# Patient Record
Sex: FEMALE | Race: BLACK OR AFRICAN AMERICAN | Employment: FULL TIME | URBAN - METROPOLITAN AREA
[De-identification: names, ages, dates, MRNs, and addresses within clinical notes are randomized per-mention and may not be internally consistent; named-entity substitution may affect disease eponyms.]

---

## 2017-10-13 ENCOUNTER — TELEPHONE (OUTPATIENT)
Dept: CARDIOLOGY CLINIC | Age: 37
End: 2017-10-13

## 2017-10-16 ENCOUNTER — OFFICE VISIT (OUTPATIENT)
Dept: CARDIOLOGY CLINIC | Age: 37
End: 2017-10-16

## 2017-10-16 VITALS
HEART RATE: 80 BPM | BODY MASS INDEX: 44.11 KG/M2 | DIASTOLIC BLOOD PRESSURE: 94 MMHG | WEIGHT: 257 LBS | SYSTOLIC BLOOD PRESSURE: 152 MMHG

## 2017-10-16 DIAGNOSIS — R55 SYNCOPE, UNSPECIFIED SYNCOPE TYPE: Primary | ICD-10-CM

## 2017-10-16 PROCEDURE — 99214 OFFICE O/P EST MOD 30 MIN: CPT | Performed by: INTERNAL MEDICINE

## 2017-10-16 NOTE — PROGRESS NOTES
Altagracia Gan MD        OFFICE  FOLLOWUP NOTE    Chief complaints: patient is here for management of syncope, HTN, dizziness  Subjective: no chest pain, + shortness of breath,+ dizziness, + palpitations    HPI Joy Jimenes is a 42 y. o.year old who  has a past medical history of Hx of echocardiogram and Hypertension. and presents for management of syncope, HTN, dizziness which are well controlled, patient felt dizzy and light headed at morning when she woke up 6:30 am, she took a showed, and when she came back to get dressed, she had dizziness. she felt off balance, she takes benedryl some times for allergies,she did not take it a the day or before syncope. No chest pain, no shortness of breath, she did not get her gastric bypass procedure. she felt palpitations. She takes verapamil for her blood pressure  Which can cause hypotension and some times bradycardia too    Current Outpatient Prescriptions   Medication Sig Dispense Refill    montelukast (SINGULAIR) 10 MG tablet Take 10 mg by mouth nightly      meloxicam (MOBIC) 15 MG tablet Take 15 mg by mouth daily      verapamil (VERELAN) 180 MG CR capsule Take 180 mg by mouth nightly      omeprazole (PRILOSEC) 20 MG capsule Take 20 mg by mouth Daily      fluticasone (FLONASE) 50 MCG/ACT nasal spray 1 spray by Nasal route 2 times daily       No current facility-administered medications for this visit. Allergies: Review of patient's allergies indicates no known allergies.   Past Medical History:   Diagnosis Date    Hx of echocardiogram 10/26/15    EF 60-65%, Normal, Mild LVH    Hypertension      Past Surgical History:   Procedure Laterality Date    BREAST SURGERY       Family History   Problem Relation Age of Onset    Heart Disease Mother      hole in heart     Social History   Substance Use Topics    Smoking status: Never Smoker    Smokeless tobacco: Never Used      Comment: Reviewed 10/16/2015    Alcohol use No      [unfilled]  Review of Systems:   · Constitutional: No Fever or Weight Loss   · Eyes: No Decreased Vision  · ENT: No Headaches, Hearing Loss or Vertigo  · Cardiovascular: No chest pain, dyspnea on exertion, + palpitations, no loss of consciousness  · Respiratory: No cough or wheezing    · Gastrointestinal: No abdominal pain, appetite loss, blood in stools, constipation, diarrhea or heartburn  · Genitourinary: No dysuria, trouble voiding, or hematuria  · Musculoskeletal:  No gait disturbance, weakness or joint complaints  · Integumentary: No rash or pruritis  · Neurological: No TIA or stroke symptoms  · Psychiatric: No anxiety or depression  · Endocrine: No malaise, fatigue or temperature intolerance  · Hematologic/Lymphatic: No bleeding problems, blood clots or swollen lymph nodes  · Allergic/Immunologic: No nasal congestion or hives  All systems negative except as marked. Objective:  BP (!) 152/94   Pulse 80   Wt 257 lb (116.6 kg)   BMI 44.11 kg/m²   Wt Readings from Last 3 Encounters:   10/16/17 257 lb (116.6 kg)   11/05/15 241 lb 8 oz (109.5 kg)   10/16/15 254 lb 9.6 oz (115.5 kg)     Body mass index is 44.11 kg/m². GENERAL - Alert, oriented, pleasant, in no apparent distress,normal grooming  HEENT  pupils are reactive to light and accomodation, cornea intact, conjunctive normal color, ears are normal in exam,throat exam in normal, teeth, gum and palate are normal, oral mucosa is normal without any notation of pallor or cyanosis  Neck - Supple. No jugular venous distention noted. No carotid bruits, no apical -carotid delay  Respiratory:  Normal breath sounds, No respiratory distress, No wheezing, No chest tenderness. ,no use of accessory muscles, diaphragm movement is normal  Cardiovascular: (PMI) apex non displaced,no lifts no thrills, no s3,no s4, Normal heart rate, Normal rhythm, No murmurs, No rubs, No gallops.  Carotid arteries pulse and amplitude are normal no bruit, no abdominal bruit noted ( normal abdominal aorta ausculation), femoral arteries pulse and amplitude are normal no bruit, pedal pulses are normal  Femoral pulses have normal amplitude, no bruits   Extremities - No cyanosis, clubbing, or significant edema, no varicose veins    Abdomen  No masses, tenderness, or organomegaly, no hepato-splenomegally, no bruits  Musculoskeletal  No significant edema, no kyphosis or scoliosis, no deformity in any extremity noted, muscle strength and tone are normal  Skin: no ulcer,no scar,no stasis dermatitis   Neurologic  alert oriented times 3,Cranial nerves II through XII are grossly intact. There were no gross focal neurologic abnormalities. All sensory and motor nerves examined and were normal  Psychiatric: normal mood and affect    No results found for: CKTOTAL, CKMB, CKMBINDEX, TROPONINI  BNP:  No results found for: BNP  PT/INR:  No results found for: PTINR  Lab Results   Component Value Date    LABA1C 6.0 10/12/2015     Lab Results   Component Value Date    CHOL 141 10/12/2015    TRIG 70 10/12/2015    HDL 39 (L) 10/12/2015    LDLDIRECT 93 10/12/2015     Lab Results   Component Value Date    ALT 15 10/12/2015    ALT 15 10/12/2015    AST 14 (L) 10/12/2015    AST 14 (L) 10/12/2015     TSH:  No results found for: TSH    Impression:  Jean Marie Antunez is a 42 y. o.year old who  has a past medical history of Hx of echocardiogram and Hypertension. and presents with     Plan:  1. Palpitations, dizziness and syncope: stress test, echo and event recorder ordered  2. Hypertension'; stable for now, if found to have bradycardia, will d.c verapamil. 3. Allergies: avoid benadryl  4. Ezema: stable  5. Health maintenance: exerise and diet  All labs, medications and tests reviewed, continue all other medications of all above medical condition listed as is.     [unfilled]

## 2017-11-06 ENCOUNTER — TELEPHONE (OUTPATIENT)
Dept: CARDIOLOGY CLINIC | Age: 37
End: 2017-11-06

## 2017-11-10 ENCOUNTER — PROCEDURE VISIT (OUTPATIENT)
Dept: CARDIOLOGY CLINIC | Age: 37
End: 2017-11-10

## 2017-11-10 DIAGNOSIS — R55 SYNCOPE, UNSPECIFIED SYNCOPE TYPE: Primary | ICD-10-CM

## 2017-11-10 DIAGNOSIS — R55 SYNCOPE, UNSPECIFIED SYNCOPE TYPE: ICD-10-CM

## 2017-11-10 DIAGNOSIS — R07.89 OTHER CHEST PAIN: Primary | ICD-10-CM

## 2017-11-10 LAB
LV EF: 55 %
LV EF: 64 %
LVEF MODALITY: NORMAL
LVEF MODALITY: NORMAL

## 2017-11-10 PROCEDURE — 93016 CV STRESS TEST SUPVJ ONLY: CPT | Performed by: INTERNAL MEDICINE

## 2017-11-10 PROCEDURE — 78452 HT MUSCLE IMAGE SPECT MULT: CPT | Performed by: INTERNAL MEDICINE

## 2017-11-10 PROCEDURE — 93306 TTE W/DOPPLER COMPLETE: CPT | Performed by: INTERNAL MEDICINE

## 2017-11-10 PROCEDURE — A9500 TC99M SESTAMIBI: HCPCS | Performed by: INTERNAL MEDICINE

## 2017-11-10 PROCEDURE — 93017 CV STRESS TEST TRACING ONLY: CPT | Performed by: INTERNAL MEDICINE

## 2017-11-10 PROCEDURE — 93018 CV STRESS TEST I&R ONLY: CPT | Performed by: INTERNAL MEDICINE

## 2017-11-13 ENCOUNTER — OFFICE VISIT (OUTPATIENT)
Dept: CARDIOLOGY CLINIC | Age: 37
End: 2017-11-13

## 2017-11-13 VITALS
HEIGHT: 64 IN | BODY MASS INDEX: 44.59 KG/M2 | DIASTOLIC BLOOD PRESSURE: 80 MMHG | SYSTOLIC BLOOD PRESSURE: 130 MMHG | HEART RATE: 80 BPM | WEIGHT: 261.2 LBS

## 2017-11-13 DIAGNOSIS — I20.8 STABLE ANGINA PECTORIS (HCC): Primary | ICD-10-CM

## 2017-11-13 PROCEDURE — 99214 OFFICE O/P EST MOD 30 MIN: CPT | Performed by: INTERNAL MEDICINE

## 2017-11-13 RX ORDER — CLONIDINE HYDROCHLORIDE 0.1 MG/1
0.1 TABLET ORAL 2 TIMES DAILY
COMMUNITY

## 2017-11-13 RX ORDER — TOPIRAMATE 25 MG/1
25 CAPSULE, COATED PELLETS ORAL 2 TIMES DAILY
COMMUNITY
End: 2017-12-07

## 2017-11-13 NOTE — PROGRESS NOTES
Steff Mcrae MD        OFFICE  FOLLOWUP NOTE    Chief complaints: patient is here for management of syncope, HTN, dizziness, chest pain  Subjective: sinus congestion,+ chest pain    HPI Magui Matson is a 42 y. o.year old who  has a past medical history of High risk of cardiac event; Hx of echocardiogram; and Hypertension. and presents for management of syncope, HTN, dizziness, chest pain, which are well controlled, she started to have sinus congestion, her stress test is abnormal, not sure if it was breast tissue artifact. she has  chest pain for last few weeks, happening daily, intermittent for 15 to 20 mins and aggravated with activity substernal also,reproducible with palpation, radiated to shoulder, 6/10, tender to touch,associated with shortness of breath, + sweating, nausea,         Current Outpatient Prescriptions   Medication Sig Dispense Refill    cloNIDine (CATAPRES) 0.1 MG tablet Take 0.1 mg by mouth 2 times daily      metoprolol tartrate (LOPRESSOR) 25 MG tablet Take 25 mg by mouth 2 times daily      topiramate (TOPAMAX SPRINKLE) 25 MG capsule Take 25 mg by mouth 2 times daily      montelukast (SINGULAIR) 10 MG tablet Take 10 mg by mouth nightly      verapamil (VERELAN) 180 MG CR capsule Take 180 mg by mouth nightly      omeprazole (PRILOSEC) 20 MG capsule Take 20 mg by mouth Daily      fluticasone (FLONASE) 50 MCG/ACT nasal spray 1 spray by Nasal route 2 times daily       No current facility-administered medications for this visit. Allergies: Review of patient's allergies indicates no known allergies.   Past Medical History:   Diagnosis Date    High risk of cardiac event 10/16/2017    sinus rhythm    Hx of echocardiogram 10/26/15    EF 60-65%, Normal, Mild LVH    Hypertension      Past Surgical History:   Procedure Laterality Date    BREAST SURGERY       Family History   Problem Relation Age of Onset    Heart Disease Mother      hole in heart     Social History   Substance Use Topics    Smoking status: Never Smoker    Smokeless tobacco: Never Used      Comment: Reviewed 10/16/2015    Alcohol use No      [unfilled]  Review of Systems:   · Constitutional: No Fever or Weight Loss   · Eyes: No Decreased Vision  · ENT: + sinus congestion,No Headaches, Hearing Loss or Vertigo  · Cardiovascular:+  chest pain,+ dyspnea on exertion, palpitations or loss of consciousness  · Respiratory: No cough or wheezing    · Gastrointestinal: No abdominal pain, appetite loss, blood in stools, constipation, diarrhea or heartburn  · Genitourinary: No dysuria, trouble voiding, or hematuria  · Musculoskeletal:  No gait disturbance, weakness or joint complaints  · Integumentary: No rash or pruritis  · Neurological: No TIA or stroke symptoms  · Psychiatric: No anxiety or depression  · Endocrine: No malaise, fatigue or temperature intolerance  · Hematologic/Lymphatic: No bleeding problems, blood clots or swollen lymph nodes  · Allergic/Immunologic: No nasal congestion or hives  All systems negative except as marked. Objective:  /80   Pulse 80   Ht 5' 4\" (1.626 m)   Wt 261 lb 3.2 oz (118.5 kg)   BMI 44.83 kg/m²   Wt Readings from Last 3 Encounters:   11/13/17 261 lb 3.2 oz (118.5 kg)   10/16/17 257 lb (116.6 kg)   11/05/15 241 lb 8 oz (109.5 kg)     Body mass index is 44.83 kg/m². GENERAL - Alert, oriented, pleasant, in no apparent distress,normal grooming  HEENT  pupils are reactive to light and accomodation, cornea intact, conjunctive normal color, ears are normal in exam,throat exam in normal, teeth, gum and palate are normal, oral mucosa is normal without any notation of pallor or cyanosis  Neck - Supple. No jugular venous distention noted. No carotid bruits, no apical -carotid delay  Respiratory:  Normal breath sounds, No respiratory distress, No wheezing, No chest tenderness. ,no use of accessory muscles, diaphragm movement is normal  Cardiovascular: (PMI) apex non displaced,no lifts no thrills, no stable  1. Health maintenance: exerise and diet  All labs, medications and tests reviewed, continue all other medications of all above medical condition listed as is.     [unfilled]

## 2017-11-14 ENCOUNTER — TELEPHONE (OUTPATIENT)
Dept: CARDIOLOGY CLINIC | Age: 37
End: 2017-11-14

## 2017-11-14 NOTE — TELEPHONE ENCOUNTER
Pt given testing results during todays office visit      Summary   Normal left ventricle structure and function.   Ejection fraction is visually estimated at 50-60%.   No significant valvular disease noted.   No evidence of pericardial effusion.   Essentially normal echo.         [facility] physician Dr. Estelle Bonner . abnormal stress test, normal LVEF,    Myocardial perfusion scan shows small size, moderate intensity, reversible    perfusion defect in anterior wall        Recommendation   JESSICA RETREAT recommended

## 2017-11-28 ENCOUNTER — HOSPITAL ENCOUNTER (OUTPATIENT)
Dept: GENERAL RADIOLOGY | Age: 37
Discharge: HOME | End: 2017-11-28
Attending: INTERNAL MEDICINE | Admitting: INTERNAL MEDICINE

## 2017-11-28 DIAGNOSIS — Z01.811 PRE-OP CHEST EXAM: ICD-10-CM

## 2017-11-28 LAB
ANION GAP SERPL CALCULATED.3IONS-SCNC: 13 MMOL/L (ref 4–16)
APTT: 35.1 SECONDS (ref 21.2–33)
BUN BLDV-MCNC: 9 MG/DL (ref 6–23)
CALCIUM SERPL-MCNC: 8.9 MG/DL (ref 8.3–10.6)
CHLORIDE BLD-SCNC: 106 MMOL/L (ref 99–110)
CO2: 22 MMOL/L (ref 21–32)
CREAT SERPL-MCNC: 0.8 MG/DL (ref 0.6–1.1)
GFR AFRICAN AMERICAN: >60 ML/MIN/1.73M2
GFR NON-AFRICAN AMERICAN: >60 ML/MIN/1.73M2
GLUCOSE BLD-MCNC: 72 MG/DL (ref 70–99)
HCT VFR BLD CALC: 36.2 % (ref 37–47)
HEMOGLOBIN: 11.7 GM/DL (ref 12.5–16)
INR BLD: 1.02 INDEX
MCH RBC QN AUTO: 28.1 PG (ref 27–31)
MCHC RBC AUTO-ENTMCNC: 32.3 % (ref 32–36)
MCV RBC AUTO: 87 FL (ref 78–100)
PDW BLD-RTO: 14 % (ref 11.7–14.9)
PLATELET # BLD: 348 K/CU MM (ref 140–440)
PMV BLD AUTO: 11.4 FL (ref 7.5–11.1)
POTASSIUM SERPL-SCNC: 4.4 MMOL/L (ref 3.5–5.1)
PROTHROMBIN TIME: 11.6 SECONDS
RBC # BLD: 4.16 M/CU MM (ref 4.2–5.4)
SODIUM BLD-SCNC: 141 MMOL/L (ref 135–145)
WBC # BLD: 6.3 K/CU MM (ref 4–10.5)

## 2017-12-07 ENCOUNTER — OFFICE VISIT (OUTPATIENT)
Dept: CARDIOLOGY CLINIC | Age: 37
End: 2017-12-07

## 2017-12-07 VITALS
BODY MASS INDEX: 44.05 KG/M2 | HEIGHT: 64 IN | DIASTOLIC BLOOD PRESSURE: 88 MMHG | HEART RATE: 60 BPM | SYSTOLIC BLOOD PRESSURE: 118 MMHG | WEIGHT: 258 LBS

## 2017-12-07 DIAGNOSIS — E66.09 CLASS 1 OBESITY DUE TO EXCESS CALORIES WITH SERIOUS COMORBIDITY IN ADULT, UNSPECIFIED BMI: Primary | ICD-10-CM

## 2017-12-07 PROCEDURE — 99214 OFFICE O/P EST MOD 30 MIN: CPT | Performed by: INTERNAL MEDICINE

## 2017-12-08 ENCOUNTER — TELEPHONE (OUTPATIENT)
Dept: CARDIOLOGY CLINIC | Age: 37
End: 2017-12-08

## 2017-12-13 ENCOUNTER — TELEPHONE (OUTPATIENT)
Dept: CARDIOLOGY CLINIC | Age: 37
End: 2017-12-13

## 2017-12-13 NOTE — TELEPHONE ENCOUNTER
Received prior auth approval for qsymia. Called Baron White to inform her of the approval. Left here a message and asked her to call me for questions or concerns.

## 2017-12-18 ENCOUNTER — OFFICE VISIT (OUTPATIENT)
Dept: CARDIOLOGY CLINIC | Age: 37
End: 2017-12-18

## 2017-12-18 VITALS
SYSTOLIC BLOOD PRESSURE: 118 MMHG | HEIGHT: 64 IN | WEIGHT: 250 LBS | HEART RATE: 64 BPM | BODY MASS INDEX: 42.68 KG/M2 | DIASTOLIC BLOOD PRESSURE: 78 MMHG

## 2017-12-18 DIAGNOSIS — E66.09 CLASS 1 OBESITY DUE TO EXCESS CALORIES WITH SERIOUS COMORBIDITY IN ADULT, UNSPECIFIED BMI: ICD-10-CM

## 2017-12-18 DIAGNOSIS — G47.10 EXCESSIVE SLEEPINESS: Primary | ICD-10-CM

## 2017-12-18 PROCEDURE — 99213 OFFICE O/P EST LOW 20 MIN: CPT | Performed by: INTERNAL MEDICINE

## 2017-12-18 NOTE — PROGRESS NOTES
Reviewed 10/16/2015    Alcohol use No      [unfilled]  Review of Systems:   · Constitutional: No Fever or Weight Loss   · Eyes: No Decreased Vision  · ENT: No Headaches, Hearing Loss or Vertigo  · Cardiovascular: No chest pain, dyspnea on exertion, palpitations or loss of consciousness  · Respiratory: No cough or wheezing    · Gastrointestinal: No abdominal pain, appetite loss, blood in stools, constipation, diarrhea or heartburn  · Genitourinary: No dysuria, trouble voiding, or hematuria  · Musculoskeletal:  No gait disturbance, weakness or joint complaints  · Integumentary: No rash or pruritis  · Neurological: No TIA or stroke symptoms  · Psychiatric: No anxiety or depression  · Endocrine: No malaise, fatigue or temperature intolerance  · Hematologic/Lymphatic: No bleeding problems, blood clots or swollen lymph nodes  · Allergic/Immunologic: No nasal congestion or hives  All systems negative except as marked. Objective:  /78   Pulse 64   Ht 5' 4\" (1.626 m)   Wt 250 lb (113.4 kg)   LMP 11/25/2017   BMI 42.91 kg/m²   Wt Readings from Last 3 Encounters:   12/18/17 250 lb (113.4 kg)   12/07/17 258 lb (117 kg)   11/30/17 261 lb (118.4 kg)     Body mass index is 42.91 kg/m². GENERAL - Alert, oriented, pleasant, in no apparent distress,normal grooming  HEENT  pupils are reactive to light and accomodation, cornea intact, conjunctive normal color, ears are normal in exam,throat exam in normal, teeth, gum and palate are normal, oral mucosa is normal without any notation of pallor or cyanosis  Neck - Supple. No jugular venous distention noted. No carotid bruits, no apical -carotid delay  Respiratory:  Normal breath sounds, No respiratory distress, No wheezing, No chest tenderness. ,no use of accessory muscles, diaphragm movement is normal  Cardiovascular: (PMI) apex non displaced,no lifts no thrills, no s3,no s4, Normal heart rate, Normal rhythm, No murmurs, No rubs, No gallops.  Carotid arteries pulse and amplitude are normal no bruit, no abdominal bruit noted ( normal abdominal aorta ausculation), femoral arteries pulse and amplitude are normal no bruit, pedal pulses are normal  Femoral pulses have normal amplitude, no bruits   Extremities - No cyanosis, clubbing, or significant edema, no varicose veins    Abdomen  No masses, tenderness, or organomegaly, no hepato-splenomegally, no bruits  Musculoskeletal  No significant edema, no kyphosis or scoliosis, no deformity in any extremity noted, muscle strength and tone are normal  Skin: no ulcer,no scar,no stasis dermatitis   Neurologic  alert oriented times 3,Cranial nerves II through XII are grossly intact. There were no gross focal neurologic abnormalities. All sensory and motor nerves examined and were normal  Psychiatric: normal mood and affect    No results found for: CKTOTAL, CKMB, CKMBINDEX, TROPONINI  BNP:  No results found for: BNP  PT/INR:  No results found for: PTINR  Lab Results   Component Value Date    LABA1C 6.0 10/12/2015     Lab Results   Component Value Date    CHOL 141 10/12/2015    TRIG 70 10/12/2015    HDL 39 (L) 10/12/2015    LDLDIRECT 93 10/12/2015     Lab Results   Component Value Date    ALT 15 10/12/2015    ALT 15 10/12/2015    AST 14 (L) 10/12/2015    AST 14 (L) 10/12/2015     TSH:  No results found for: TSH    Impression:  Kera Whittington is a 42 y. o.year old who  has a past medical history of H/O echocardiogram; High risk of cardiac event; History of nuclear stress test; Hx of echocardiogram; and Hypertension. and presents with     Plan:  1. Palpitations, dizziness and syncope: abnoromal stress test,normal [facility]  2. Chest pain: resolved chest pain as above  3. Obesity: refill qsymia and increase it. 4. Sinus congestion: ok to use OTC medications  5. Hypertension';controlled, on 3 blood pressure medications./  6. Allergies: avoid benadryl  1.  Ezema: stableHealth maintenance: exerise and diet  All labs, medications and tests reviewed, continue all other medications of all above medical condition listed as is.     [unfilled]

## 2018-03-19 ENCOUNTER — OFFICE VISIT (OUTPATIENT)
Dept: CARDIOLOGY CLINIC | Age: 38
End: 2018-03-19

## 2018-03-19 VITALS
WEIGHT: 242.6 LBS | HEIGHT: 64 IN | BODY MASS INDEX: 41.42 KG/M2 | HEART RATE: 76 BPM | DIASTOLIC BLOOD PRESSURE: 80 MMHG | SYSTOLIC BLOOD PRESSURE: 110 MMHG

## 2018-03-19 DIAGNOSIS — E66.09 CLASS 1 OBESITY DUE TO EXCESS CALORIES WITH SERIOUS COMORBIDITY IN ADULT, UNSPECIFIED BMI: ICD-10-CM

## 2018-03-19 PROCEDURE — 99214 OFFICE O/P EST MOD 30 MIN: CPT | Performed by: INTERNAL MEDICINE

## 2018-03-19 NOTE — PROGRESS NOTES
10/16/2015    Alcohol use No      [unfilled]  Review of Systems:   · Constitutional: No Fever or Weight Loss   · Eyes: No Decreased Vision  · ENT: No Headaches, Hearing Loss or Vertigo  · Cardiovascular: No chest pain, dyspnea on exertion, palpitations or loss of consciousness  · Respiratory: No cough or wheezing    · Gastrointestinal: No abdominal pain, appetite loss, blood in stools, constipation, diarrhea or heartburn  · Genitourinary: No dysuria, trouble voiding, or hematuria  · Musculoskeletal:  No gait disturbance, weakness or joint complaints  · Integumentary: No rash or pruritis  · Neurological: No TIA or stroke symptoms  · Psychiatric: No anxiety or depression  · Endocrine: No malaise, fatigue or temperature intolerance  · Hematologic/Lymphatic: No bleeding problems, blood clots or swollen lymph nodes  · Allergic/Immunologic: No nasal congestion or hives  All systems negative except as marked. Objective:  /80   Pulse 76   Ht 5' 4\" (1.626 m)   Wt 242 lb 9.6 oz (110 kg)   BMI 41.64 kg/m²   Wt Readings from Last 3 Encounters:   03/19/18 242 lb 9.6 oz (110 kg)   12/18/17 250 lb (113.4 kg)   12/07/17 258 lb (117 kg)     Body mass index is 41.64 kg/m². GENERAL - Alert, oriented, pleasant, in no apparent distress,normal grooming  HEENT  pupils are reactive to light and accomodation, cornea intact, conjunctive normal color, ears are normal in exam,throat exam in normal, teeth, gum and palate are normal, oral mucosa is normal without any notation of pallor or cyanosis  Neck - Supple. No jugular venous distention noted. No carotid bruits, no apical -carotid delay  Respiratory:  Normal breath sounds, No respiratory distress, No wheezing, No chest tenderness. ,no use of accessory muscles, diaphragm movement is normal  Cardiovascular: (PMI) apex non displaced,no lifts no thrills, no s3,no s4, Normal heart rate, Normal rhythm, No murmurs, No rubs, No gallops.  Carotid arteries pulse and amplitude are normal no bruit, no abdominal bruit noted ( normal abdominal aorta ausculation), femoral arteries pulse and amplitude are normal no bruit, pedal pulses are normal  Femoral pulses have normal amplitude, no bruits   Extremities - No cyanosis, clubbing, or significant edema, no varicose veins    Abdomen  No masses, tenderness, or organomegaly, no hepato-splenomegally, no bruits  Musculoskeletal  No significant edema, no kyphosis or scoliosis, no deformity in any extremity noted, muscle strength and tone are normal  Skin: no ulcer,no scar,no stasis dermatitis   Neurologic  alert oriented times 3,Cranial nerves II through XII are grossly intact. There were no gross focal neurologic abnormalities. All sensory and motor nerves examined and were normal  Psychiatric: normal mood and affect    No results found for: CKTOTAL, CKMB, CKMBINDEX, TROPONINI  BNP:  No results found for: BNP  PT/INR:  No results found for: PTINR  Lab Results   Component Value Date    LABA1C 6.0 10/12/2015     Lab Results   Component Value Date    CHOL 141 10/12/2015    TRIG 70 10/12/2015    HDL 39 (L) 10/12/2015    LDLDIRECT 93 10/12/2015     Lab Results   Component Value Date    ALT 15 10/12/2015    ALT 15 10/12/2015    AST 14 (L) 10/12/2015    AST 14 (L) 10/12/2015     TSH:  No results found for: TSH    Impression:  Cole Zeng is a 34 y. o.year old who  has a past medical history of H/O echocardiogram; High risk of cardiac event; History of nuclear stress test; Hx of echocardiogram; and Hypertension. and presents with     Plan:  1. Palpitations, dizziness and syncope: abnoromal stress test,normal [facility]  2. Chest pain: resolved chest pain as above  3. Obesity: refill qsymia   4. Sinus congestion: ok to use OTC medications  5. Hypertension';controlled, on 3 blood pressure medications./  6. Allergies: avoid benadryl  7.  Ezema: stableHealth maintenance: exerise and dietHealth maintenance: exerise and diet  All labs, medications and tests reviewed, continue all

## 2018-04-10 ENCOUNTER — OFFICE VISIT (OUTPATIENT)
Dept: PHYSICAL MEDICINE AND REHAB | Age: 38
End: 2018-04-10

## 2018-04-10 DIAGNOSIS — M79.601 PARESTHESIA AND PAIN OF BOTH UPPER EXTREMITIES: Primary | ICD-10-CM

## 2018-04-10 DIAGNOSIS — M79.602 PARESTHESIA AND PAIN OF BOTH UPPER EXTREMITIES: Primary | ICD-10-CM

## 2018-04-10 DIAGNOSIS — R20.2 PARESTHESIA AND PAIN OF BOTH UPPER EXTREMITIES: Primary | ICD-10-CM

## 2018-04-10 DIAGNOSIS — M79.642 PAIN IN BOTH HANDS: ICD-10-CM

## 2018-04-10 DIAGNOSIS — M79.641 PAIN IN BOTH HANDS: ICD-10-CM

## 2018-04-10 PROCEDURE — 95911 NRV CNDJ TEST 9-10 STUDIES: CPT | Performed by: PHYSICAL MEDICINE & REHABILITATION

## 2018-04-10 PROCEDURE — 95886 MUSC TEST DONE W/N TEST COMP: CPT | Performed by: PHYSICAL MEDICINE & REHABILITATION

## 2018-07-26 ENCOUNTER — OFFICE VISIT (OUTPATIENT)
Dept: CARDIOLOGY CLINIC | Age: 38
End: 2018-07-26

## 2018-07-26 VITALS
HEIGHT: 64 IN | SYSTOLIC BLOOD PRESSURE: 110 MMHG | HEART RATE: 72 BPM | BODY MASS INDEX: 41.83 KG/M2 | DIASTOLIC BLOOD PRESSURE: 80 MMHG | WEIGHT: 245 LBS

## 2018-07-26 DIAGNOSIS — E66.09 CLASS 1 OBESITY DUE TO EXCESS CALORIES WITH SERIOUS COMORBIDITY IN ADULT, UNSPECIFIED BMI: Primary | ICD-10-CM

## 2018-07-26 PROCEDURE — 99214 OFFICE O/P EST MOD 30 MIN: CPT | Performed by: INTERNAL MEDICINE

## 2018-07-26 NOTE — PATIENT INSTRUCTIONS
Please remember to bring all medication bottles or a medication list with you to your appointment. If you have any questions, please call our office at [phone].

## 2018-08-23 ENCOUNTER — OFFICE VISIT (OUTPATIENT)
Dept: CARDIOLOGY CLINIC | Age: 38
End: 2018-08-23

## 2018-08-23 VITALS
HEART RATE: 86 BPM | DIASTOLIC BLOOD PRESSURE: 80 MMHG | SYSTOLIC BLOOD PRESSURE: 110 MMHG | HEIGHT: 64 IN | WEIGHT: 244.4 LBS | BODY MASS INDEX: 41.73 KG/M2

## 2018-08-23 DIAGNOSIS — E66.09 CLASS 1 OBESITY DUE TO EXCESS CALORIES WITH SERIOUS COMORBIDITY IN ADULT, UNSPECIFIED BMI: ICD-10-CM

## 2018-08-23 PROCEDURE — 99214 OFFICE O/P EST MOD 30 MIN: CPT | Performed by: INTERNAL MEDICINE

## 2018-08-23 NOTE — PROGRESS NOTES
Phares Gosselin, MD        OFFICE  FOLLOWUP NOTE    Chief complaints: patient is here for management of syncope, HTN, dizziness, chest pain, obesity    Subjective: patient feels better, no chest pain, no shortness of breath, no dizziness, no palpitations    HPI Padma Rose is a 34 y. o.year old who  has a past medical history of H/O echocardiogram; High risk of cardiac event; History of nuclear stress test; Hx of echocardiogram; and Hypertension. and presents for management of syncope, HTN, dizziness, chest pain, obesity which are well controlled, she lost 1 lbs on qsymia. Current Outpatient Prescriptions   Medication Sig Dispense Refill    Phentermine-Topiramate (QSYMIA) 11.25-69 MG CP24 Take 1 tablet by mouth daily for 31 days. . 30 capsule 0    cloNIDine (CATAPRES) 0.1 MG tablet Take 0.1 mg by mouth 2 times daily      metoprolol tartrate (LOPRESSOR) 25 MG tablet Take 25 mg by mouth 2 times daily      montelukast (SINGULAIR) 10 MG tablet Take 10 mg by mouth nightly      verapamil (VERELAN) 180 MG CR capsule Take 180 mg by mouth nightly      omeprazole (PRILOSEC) 20 MG capsule Take 20 mg by mouth Daily      fluticasone (FLONASE) 50 MCG/ACT nasal spray 1 spray by Nasal route 2 times daily       No current facility-administered medications for this visit. Allergies: Patient has no known allergies.   Past Medical History:   Diagnosis Date    H/O echocardiogram 11/10/2017    EF 50-60%     High risk of cardiac event 10/16/2017    sinus rhythm    History of nuclear stress test 11/10/2017    abnormal stress test    Hx of echocardiogram 10/26/15    EF 60-65%, Normal, Mild LVH    Hypertension      Past Surgical History:   Procedure Laterality Date    BREAST SURGERY       Family History   Problem Relation Age of Onset    Heart Disease Mother         hole in heart     Social History   Substance Use Topics    Smoking status: Never Smoker    Smokeless tobacco: Never Used      Comment: Reviewed 10/16/2015    Alcohol use No      [unfilled]  Review of Systems:   · Constitutional: No Fever or Weight Loss   · Eyes: No Decreased Vision  · ENT: No Headaches, Hearing Loss or Vertigo  · Cardiovascular: No chest pain, dyspnea on exertion, palpitations or loss of consciousness  · Respiratory: No cough or wheezing    · Gastrointestinal: No abdominal pain, appetite loss, blood in stools, constipation, diarrhea or heartburn  · Genitourinary: No dysuria, trouble voiding, or hematuria  · Musculoskeletal:  No gait disturbance, weakness or joint complaints  · Integumentary: No rash or pruritis  · Neurological: No TIA or stroke symptoms  · Psychiatric: No anxiety or depression  · Endocrine: No malaise, fatigue or temperature intolerance  · Hematologic/Lymphatic: No bleeding problems, blood clots or swollen lymph nodes  · Allergic/Immunologic: No nasal congestion or hives  All systems negative except as marked. Objective:  /80   Pulse 86   Ht 5' 4\" (1.626 m)   Wt 244 lb 6.4 oz (110.9 kg)   BMI 41.95 kg/m²   Wt Readings from Last 3 Encounters:   08/23/18 244 lb 6.4 oz (110.9 kg)   07/26/18 245 lb (111.1 kg)   03/19/18 242 lb 9.6 oz (110 kg)     Body mass index is 41.95 kg/m². GENERAL - Alert, oriented, pleasant, in no apparent distress,normal grooming  HEENT  pupils are reactive to light and accomodation, cornea intact, conjunctive normal color, ears are normal in exam,throat exam in normal, teeth, gum and palate are normal, oral mucosa is normal without any notation of pallor or cyanosis  Neck - Supple. No jugular venous distention noted. No carotid bruits, no apical -carotid delay  Respiratory:  Normal breath sounds, No respiratory distress, No wheezing, No chest tenderness. ,no use of accessory muscles, diaphragm movement is normal  Cardiovascular: (PMI) apex non displaced,no lifts no thrills, no s3,no s4, Normal heart rate, Normal rhythm, No murmurs, No rubs, No gallops.  Carotid arteries pulse and amplitude

## 2019-03-21 ENCOUNTER — OFFICE VISIT (OUTPATIENT)
Dept: FAMILY MEDICINE CLINIC | Age: 39
End: 2019-03-21
Payer: COMMERCIAL

## 2019-03-21 VITALS
OXYGEN SATURATION: 96 % | BODY MASS INDEX: 43.99 KG/M2 | WEIGHT: 264 LBS | DIASTOLIC BLOOD PRESSURE: 86 MMHG | HEIGHT: 65 IN | HEART RATE: 69 BPM | SYSTOLIC BLOOD PRESSURE: 124 MMHG | TEMPERATURE: 99.1 F

## 2019-03-21 DIAGNOSIS — J02.9 SORE THROAT: ICD-10-CM

## 2019-03-21 DIAGNOSIS — R51.9 FRONTAL HEADACHE: Primary | ICD-10-CM

## 2019-03-21 PROCEDURE — 99213 OFFICE O/P EST LOW 20 MIN: CPT | Performed by: NURSE PRACTITIONER

## 2019-03-21 ASSESSMENT — PATIENT HEALTH QUESTIONNAIRE - PHQ9
SUM OF ALL RESPONSES TO PHQ9 QUESTIONS 1 & 2: 0
2. FEELING DOWN, DEPRESSED OR HOPELESS: 0
1. LITTLE INTEREST OR PLEASURE IN DOING THINGS: 0
SUM OF ALL RESPONSES TO PHQ QUESTIONS 1-9: 0
SUM OF ALL RESPONSES TO PHQ QUESTIONS 1-9: 0

## 2019-03-21 ASSESSMENT — ENCOUNTER SYMPTOMS
ABDOMINAL PAIN: 1
SORE THROAT: 1
WHEEZING: 0
CHEST TIGHTNESS: 0
SHORTNESS OF BREATH: 0
COUGH: 0
NAUSEA: 0

## 2019-11-29 ENCOUNTER — HOSPITAL ENCOUNTER (OUTPATIENT)
Dept: WOMENS IMAGING | Age: 39
Discharge: HOME | End: 2019-11-29
Payer: COMMERCIAL

## 2019-11-29 DIAGNOSIS — Z12.31 BREAST CANCER SCREENING BY MAMMOGRAM: ICD-10-CM

## 2019-11-29 PROCEDURE — 77063 BREAST TOMOSYNTHESIS BI: CPT

## 2021-04-27 ENCOUNTER — HOSPITAL ENCOUNTER (OUTPATIENT)
Dept: MAMMOGRAPHY | Age: 41
Discharge: HOME | End: 2021-04-27
Payer: COMMERCIAL

## 2021-04-27 DIAGNOSIS — Z12.31 ENCOUNTER FOR SCREENING MAMMOGRAM FOR MALIGNANT NEOPLASM OF BREAST: ICD-10-CM

## 2021-04-27 PROCEDURE — 77063 BREAST TOMOSYNTHESIS BI: CPT

## 2022-08-03 ENCOUNTER — HOSPITAL ENCOUNTER (OUTPATIENT)
Dept: WOMENS IMAGING | Age: 42
Discharge: HOME | End: 2022-08-03
Payer: COMMERCIAL

## 2022-08-03 DIAGNOSIS — Z12.31 ENCOUNTER FOR SCREENING MAMMOGRAM FOR MALIGNANT NEOPLASM OF BREAST: ICD-10-CM

## 2022-08-03 PROCEDURE — 77063 BREAST TOMOSYNTHESIS BI: CPT

## 2023-09-28 LAB
ALBUMIN SERPL-MCNC: 4.3 G/DL
ALP BLD-CCNC: 67 U/L
ALT SERPL-CCNC: 23 U/L
ANION GAP SERPL CALCULATED.3IONS-SCNC: 1.8 MMOL/L
AST SERPL-CCNC: 20 U/L
BASOPHILS ABSOLUTE: 0 /ΜL
BASOPHILS RELATIVE PERCENT: 0.2 %
BILIRUB SERPL-MCNC: 0.4 MG/DL (ref 0.1–1.4)
BUN BLDV-MCNC: 7 MG/DL
CALCIUM SERPL-MCNC: 9.3 MG/DL
CHLORIDE BLD-SCNC: 102 MMOL/L
CO2: 23 MMOL/L
CREAT SERPL-MCNC: 0.7 MG/DL
EGFR: 109
EOSINOPHILS ABSOLUTE: 0.1 /ΜL
EOSINOPHILS RELATIVE PERCENT: 0.6 %
GLUCOSE BLD-MCNC: 111 MG/DL
HCT VFR BLD CALC: 33.9 % (ref 36–46)
HEMOGLOBIN: 11.5 G/DL (ref 12–16)
LYMPHOCYTES ABSOLUTE: 1.6 /ΜL
LYMPHOCYTES RELATIVE PERCENT: 19 %
MCH RBC QN AUTO: 28.2 PG
MCHC RBC AUTO-ENTMCNC: 33.9 G/DL
MCV RBC AUTO: 83.1 FL
MONOCYTES ABSOLUTE: 0.5 /ΜL
MONOCYTES RELATIVE PERCENT: 5.7 %
NEUTROPHILS ABSOLUTE: 6.1 /ΜL
NEUTROPHILS RELATIVE PERCENT: 74.3 %
PLATELET # BLD: 312 K/ΜL
PMV BLD AUTO: 11.2 FL
POTASSIUM SERPL-SCNC: 4.2 MMOL/L
RBC # BLD: 4.08 10^6/ΜL
SODIUM BLD-SCNC: 135 MMOL/L
TOTAL PROTEIN: 6.7
WBC # BLD: 8.2 10^3/ML

## 2023-10-09 ENCOUNTER — HOSPITAL ENCOUNTER (OUTPATIENT)
Dept: WOMENS IMAGING | Age: 43
Discharge: HOME | End: 2023-10-09
Payer: COMMERCIAL

## 2023-10-09 VITALS — WEIGHT: 279 LBS | HEIGHT: 64 IN | BODY MASS INDEX: 47.63 KG/M2

## 2023-10-09 DIAGNOSIS — Z12.31 SCREENING MAMMOGRAM, ENCOUNTER FOR: ICD-10-CM

## 2023-10-09 PROCEDURE — 77063 BREAST TOMOSYNTHESIS BI: CPT

## 2023-11-02 ENCOUNTER — OFFICE VISIT MH/BH (OUTPATIENT)
Dept: BARIATRICS/WEIGHT MGMT | Age: 43
End: 2023-11-02
Payer: COMMERCIAL

## 2023-11-02 ENCOUNTER — TELEPHONE (OUTPATIENT)
Dept: SURGERY | Age: 43
End: 2023-11-02

## 2023-11-02 ENCOUNTER — HOSPITAL ENCOUNTER (OUTPATIENT)
Age: 43
Setting detail: SPECIMEN
Discharge: HOME | End: 2023-11-02
Payer: COMMERCIAL

## 2023-11-02 VITALS
DIASTOLIC BLOOD PRESSURE: 88 MMHG | WEIGHT: 282.3 LBS | HEART RATE: 82 BPM | HEIGHT: 65 IN | OXYGEN SATURATION: 92 % | SYSTOLIC BLOOD PRESSURE: 124 MMHG | BODY MASS INDEX: 47.03 KG/M2

## 2023-11-02 DIAGNOSIS — E66.01 MORBID OBESITY WITH BMI OF 45.0-49.9, ADULT (HCC): Primary | ICD-10-CM

## 2023-11-02 DIAGNOSIS — Z79.899 MEDICATION MANAGEMENT: ICD-10-CM

## 2023-11-02 LAB
AMPHETAMINES: NEGATIVE
BARBITURATE SCREEN URINE: NEGATIVE
BENZODIAZEPINE SCREEN, URINE: NEGATIVE
CANNABINOID SCREEN URINE: NEGATIVE
COCAINE METABOLITE: NEGATIVE
FENTANYL URINE: NEGATIVE
INTERPRETATION: NORMAL
OPIATES, URINE: NEGATIVE
OXYCODONE: NEGATIVE
PREGNANCY, URINE: NEGATIVE

## 2023-11-02 PROCEDURE — 80307 DRUG TEST PRSMV CHEM ANLYZR: CPT

## 2023-11-02 PROCEDURE — 99204 OFFICE O/P NEW MOD 45 MIN: CPT | Performed by: NURSE PRACTITIONER

## 2023-11-02 PROCEDURE — 81025 URINE PREGNANCY TEST: CPT

## 2023-11-02 RX ORDER — ALBUTEROL SULFATE 90 UG/1
2 AEROSOL, METERED RESPIRATORY (INHALATION) 2 TIMES DAILY
COMMUNITY
Start: 2019-09-24

## 2023-11-02 RX ORDER — AZELASTINE 1 MG/ML
1 SPRAY, METERED NASAL 2 TIMES DAILY
COMMUNITY

## 2023-11-02 RX ORDER — OLOPATADINE HYDROCHLORIDE 1 MG/ML
1 SOLUTION/ DROPS OPHTHALMIC 2 TIMES DAILY
COMMUNITY

## 2023-11-02 RX ORDER — PANTOPRAZOLE SODIUM 40 MG/1
40 TABLET, DELAYED RELEASE ORAL DAILY
COMMUNITY
Start: 2023-08-07

## 2023-11-02 RX ORDER — CYCLOSPORINE 0.5 MG/ML
2 EMULSION OPHTHALMIC 2 TIMES DAILY
COMMUNITY

## 2023-11-02 NOTE — TELEPHONE ENCOUNTER
Called [facility] [phone]   For labs. Staff stated they looked back 6 mo and could not find labs orders or results.

## 2023-11-13 ENCOUNTER — HOSPITAL ENCOUNTER (OUTPATIENT)
Age: 43
Discharge: HOME | End: 2023-11-13
Payer: COMMERCIAL

## 2023-11-13 LAB
EKG ATRIAL RATE: 61 BPM
EKG DIAGNOSIS: NORMAL
EKG P AXIS: 28 DEGREES
EKG P-R INTERVAL: 146 MS
EKG Q-T INTERVAL: 404 MS
EKG QRS DURATION: 84 MS
EKG QTC CALCULATION (BAZETT): 406 MS
EKG R AXIS: -6 DEGREES
EKG T AXIS: -2 DEGREES
EKG VENTRICULAR RATE: 61 BPM

## 2023-11-13 PROCEDURE — 93010 ELECTROCARDIOGRAM REPORT: CPT | Performed by: INTERNAL MEDICINE

## 2023-11-13 PROCEDURE — 93005 ELECTROCARDIOGRAM TRACING: CPT | Performed by: NURSE PRACTITIONER

## 2023-11-16 ENCOUNTER — OFFICE VISIT (OUTPATIENT)
Dept: BARIATRICS/WEIGHT MGMT | Age: 43
End: 2023-11-16
Payer: COMMERCIAL

## 2023-11-16 VITALS
SYSTOLIC BLOOD PRESSURE: 110 MMHG | DIASTOLIC BLOOD PRESSURE: 90 MMHG | OXYGEN SATURATION: 97 % | WEIGHT: 282.2 LBS | BODY MASS INDEX: 47.02 KG/M2 | HEART RATE: 64 BPM | HEIGHT: 65 IN

## 2023-11-16 DIAGNOSIS — E66.01 MORBID OBESITY WITH BMI OF 45.0-49.9, ADULT (HCC): Primary | ICD-10-CM

## 2023-11-16 DIAGNOSIS — Z79.899 MEDICATION MANAGEMENT: ICD-10-CM

## 2023-11-16 PROCEDURE — 99214 OFFICE O/P EST MOD 30 MIN: CPT | Performed by: NURSE PRACTITIONER

## 2023-11-16 RX ORDER — PHENTERMINE HYDROCHLORIDE 37.5 MG/1
37.5 TABLET ORAL
Qty: 30 TABLET | Refills: 0 | Status: SHIPPED | OUTPATIENT
Start: 2023-11-16 | End: 2023-12-16

## 2023-12-14 ENCOUNTER — OFFICE VISIT (OUTPATIENT)
Dept: BARIATRICS/WEIGHT MGMT | Age: 43
End: 2023-12-14
Payer: COMMERCIAL

## 2023-12-14 VITALS
SYSTOLIC BLOOD PRESSURE: 120 MMHG | HEART RATE: 83 BPM | OXYGEN SATURATION: 96 % | HEIGHT: 65 IN | BODY MASS INDEX: 45.12 KG/M2 | DIASTOLIC BLOOD PRESSURE: 80 MMHG | WEIGHT: 270.8 LBS

## 2023-12-14 DIAGNOSIS — Z79.899 MEDICATION MANAGEMENT: ICD-10-CM

## 2023-12-14 DIAGNOSIS — E66.01 MORBID OBESITY WITH BMI OF 45.0-49.9, ADULT (HCC): Primary | ICD-10-CM

## 2023-12-14 PROCEDURE — 99214 OFFICE O/P EST MOD 30 MIN: CPT | Performed by: NURSE PRACTITIONER

## 2023-12-14 RX ORDER — SENNOSIDES A AND B 8.6 MG/1
1 TABLET, FILM COATED ORAL 2 TIMES DAILY
Qty: 60 TABLET | Refills: 11 | Status: SHIPPED | OUTPATIENT
Start: 2023-12-14 | End: 2024-12-13

## 2023-12-14 RX ORDER — PHENTERMINE HYDROCHLORIDE 37.5 MG/1
37.5 TABLET ORAL
Qty: 30 TABLET | Refills: 0 | Status: SHIPPED | OUTPATIENT
Start: 2023-12-14 | End: 2024-01-13

## 2024-01-15 ENCOUNTER — OFFICE VISIT (OUTPATIENT)
Dept: BARIATRICS/WEIGHT MGMT | Age: 44
End: 2024-01-15
Payer: COMMERCIAL

## 2024-01-15 VITALS
DIASTOLIC BLOOD PRESSURE: 82 MMHG | BODY MASS INDEX: 44.97 KG/M2 | HEART RATE: 86 BPM | OXYGEN SATURATION: 92 % | WEIGHT: 269.9 LBS | SYSTOLIC BLOOD PRESSURE: 120 MMHG | HEIGHT: 65 IN

## 2024-01-15 DIAGNOSIS — E66.01 MORBID OBESITY WITH BMI OF 45.0-49.9, ADULT (HCC): Primary | ICD-10-CM

## 2024-01-15 DIAGNOSIS — Z79.899 MEDICATION MANAGEMENT: ICD-10-CM

## 2024-01-15 PROCEDURE — 99214 OFFICE O/P EST MOD 30 MIN: CPT | Performed by: NURSE PRACTITIONER

## 2024-01-15 RX ORDER — VERAPAMIL HYDROCHLORIDE 240 MG/1
240 TABLET, FILM COATED, EXTENDED RELEASE ORAL DAILY
COMMUNITY
Start: 2024-01-04

## 2024-01-15 RX ORDER — PHENTERMINE HYDROCHLORIDE 37.5 MG/1
37.5 TABLET ORAL
Qty: 30 TABLET | Refills: 0 | Status: SHIPPED | OUTPATIENT
Start: 2024-01-15 | End: 2024-02-14

## 2024-01-15 RX ORDER — METOPROLOL SUCCINATE 25 MG/1
25 TABLET, EXTENDED RELEASE ORAL DAILY
COMMUNITY
Start: 2024-01-04

## 2024-01-15 NOTE — PROGRESS NOTES
Chief Complaint   Patient presents with    Weight Management     Med WM Adipex # 3         SUBJECTIVE:    HPI: Patient is here with complaints of: Monthly Adipex visit.    Obesity with a BMI of Body mass index is 45.61 kg/m²..    Current weight: 269 pounds    Weight change since last visit: -0.9 pounds (if pt failed to lose weight, cannot remain on medication).    Month 3 of being on Adipex.     Any new absolute contraindications (glaucoma, drug abuse, CAD, uncontrolled HTN, arrhythmias, stroke, CHF, hyperthyroidism, MAOI use, pregnant or breastfeeding) to being on medication: DENIES     Pt complains of the following side effects: DENIES    Current dietary measures: not tracking calories; eating more carbs than usual    Current exercise measures: limited     I have reviewed the patient's(pertinent information to this visit) medical history, family history(scanned in  the [employer] under \"patient questioner\"), social history and review of systems with the patient today in the office.          Past Surgical History:   Procedure Laterality Date    BREAST REDUCTION SURGERY Bilateral     age 16    BREAST SURGERY         Past Medical History:   Diagnosis Date    H/O echocardiogram 11/10/2017    EF 50-60%     High risk of cardiac event 10/16/2017    sinus rhythm    History of nuclear stress test 11/10/2017    abnormal stress test    Hx of echocardiogram 10/26/15    EF 60-65%, Normal, Mild LVH    Hypertension        Family History   Problem Relation Age of Onset    Uterine Cancer Mother     Heart Disease Mother         hole in heart    Breast Cancer Maternal Aunt     Breast Cancer Maternal Aunt     Ovarian Cancer Neg Hx        Social History     Socioeconomic History    Marital status:      Spouse name: Not on file    Number of children: Not on file    Years of education: Not on file    Highest education level: Not on file   Occupational History    Not on file   Tobacco Use    Smoking status: Never    Smokeless

## 2024-02-19 ENCOUNTER — OFFICE VISIT (OUTPATIENT)
Dept: BARIATRICS/WEIGHT MGMT | Age: 44
End: 2024-02-19
Payer: COMMERCIAL

## 2024-02-19 VITALS
BODY MASS INDEX: 43.75 KG/M2 | OXYGEN SATURATION: 96 % | SYSTOLIC BLOOD PRESSURE: 138 MMHG | WEIGHT: 262.6 LBS | HEART RATE: 82 BPM | DIASTOLIC BLOOD PRESSURE: 84 MMHG | HEIGHT: 65 IN

## 2024-02-19 DIAGNOSIS — Z79.899 MEDICATION MANAGEMENT: ICD-10-CM

## 2024-02-19 DIAGNOSIS — E66.01 MORBID OBESITY WITH BMI OF 40.0-44.9, ADULT (HCC): Primary | ICD-10-CM

## 2024-02-19 PROCEDURE — 99214 OFFICE O/P EST MOD 30 MIN: CPT | Performed by: NURSE PRACTITIONER

## 2024-02-19 RX ORDER — PHENTERMINE HYDROCHLORIDE 37.5 MG/1
37.5 TABLET ORAL
Qty: 30 TABLET | Refills: 0 | Status: SHIPPED | OUTPATIENT
Start: 2024-02-19 | End: 2024-03-20

## 2024-02-19 ASSESSMENT — ENCOUNTER SYMPTOMS: CONSTIPATION: 1

## 2024-02-19 NOTE — PROGRESS NOTES
Chief Complaint   Patient presents with    Weight Management     F/U Medication WM Visit - Adipex #4  Has Bar Cov         SUBJECTIVE:    HPI: Patient is here with complaints of: Monthly Adipex visit.    Obesity with a BMI of Body mass index is 44.38 kg/m²..    Current weight: 262 pounds    Weight change since last visit: -7.3 pounds (if pt failed to lose weight, cannot remain on medication).    Month 4 of being on Adipex.     Any new absolute contraindications (glaucoma, drug abuse, CAD, uncontrolled HTN, arrhythmias, stroke, CHF, hyperthyroidism, MAOI use, pregnant or breastfeeding) to being on medication: DENIES     Pt complains of the following side effects: DENIES    Current dietary measures: eating less carbs; more protein in diet; snacking less in evenings    Current exercise measures: limited    I have reviewed the patient's(pertinent information to this visit) medical history, family history(scanned in  the [employer] under \"patient questioner\"), social history and review of systems with the patient today in the office.          Past Surgical History:   Procedure Laterality Date    BREAST REDUCTION SURGERY Bilateral     age 16    BREAST SURGERY         Past Medical History:   Diagnosis Date    H/O echocardiogram 11/10/2017    EF 50-60%     High risk of cardiac event 10/16/2017    sinus rhythm    History of nuclear stress test 11/10/2017    abnormal stress test    Hx of echocardiogram 10/26/15    EF 60-65%, Normal, Mild LVH    Hypertension        Family History   Problem Relation Age of Onset    Uterine Cancer Mother     Heart Disease Mother         hole in heart    Breast Cancer Maternal Aunt     Breast Cancer Maternal Aunt     Ovarian Cancer Neg Hx        Social History     Socioeconomic History    Marital status:      Spouse name: Not on file    Number of children: Not on file    Years of education: Not on file    Highest education level: Not on file   Occupational History    Not on file   Tobacco Use

## 2024-03-20 ENCOUNTER — OFFICE VISIT (OUTPATIENT)
Dept: BARIATRICS/WEIGHT MGMT | Age: 44
End: 2024-03-20
Payer: COMMERCIAL

## 2024-03-20 VITALS
OXYGEN SATURATION: 95 % | BODY MASS INDEX: 43.42 KG/M2 | HEART RATE: 89 BPM | WEIGHT: 260.6 LBS | DIASTOLIC BLOOD PRESSURE: 74 MMHG | SYSTOLIC BLOOD PRESSURE: 112 MMHG | HEIGHT: 65 IN

## 2024-03-20 DIAGNOSIS — E66.01 MORBID OBESITY WITH BMI OF 40.0-44.9, ADULT (HCC): Primary | ICD-10-CM

## 2024-03-20 DIAGNOSIS — Z79.899 MEDICATION MANAGEMENT: ICD-10-CM

## 2024-03-20 PROCEDURE — 99214 OFFICE O/P EST MOD 30 MIN: CPT | Performed by: NURSE PRACTITIONER

## 2024-03-20 RX ORDER — PHENTERMINE HYDROCHLORIDE 37.5 MG/1
37.5 TABLET ORAL
Qty: 30 TABLET | Refills: 0 | Status: SHIPPED | OUTPATIENT
Start: 2024-03-20 | End: 2024-04-19

## 2024-03-20 NOTE — PROGRESS NOTES
Smokeless tobacco: Never    Tobacco comments:     Reviewed 10/16/2015   Vaping Use    Vaping Use: Never used   Substance and Sexual Activity    Alcohol use: No    Drug use: No    Sexual activity: Not on file     Comment:    Other Topics Concern    Not on file   Social History Narrative    Not on file     Social Determinants of Health     Financial Resource Strain: Not on file   Food Insecurity: Not on file   Transportation Needs: Not on file   Physical Activity: Not on file   Stress: Not on file   Social Connections: Not on file   Intimate Partner Violence: Not on file   Housing Stability: Not on file       Current Outpatient Medications   Medication Sig Dispense Refill    phentermine (ADIPEX-P) 37.5 MG tablet Take 1 tablet by mouth every morning (before breakfast) for 30 days. BMI 44. Max Daily Amount: 37.5 mg 30 tablet 0    phentermine (ADIPEX-P) 37.5 MG tablet Take 1 tablet by mouth every morning (before breakfast) for 30 days. BMI 44. Max Daily Amount: 37.5 mg 30 tablet 0    metoprolol succinate (TOPROL XL) 25 MG extended release tablet Take 1 tablet by mouth daily      verapamil (CALAN SR) 240 MG extended release tablet Take 1 tablet by mouth daily      senna (SENOKOT) 8.6 MG tablet Take 1 tablet by mouth 2 times daily 60 tablet 11    pantoprazole (PROTONIX) 40 MG tablet Take 1 tablet by mouth daily      albuterol sulfate HFA (PROVENTIL;VENTOLIN;PROAIR) 108 (90 Base) MCG/ACT inhaler Inhale 2 puffs into the lungs in the morning and at bedtime      cycloSPORINE (RESTASIS) 0.05 % ophthalmic emulsion Apply 2 drops to eye in the morning and at bedtime      olopatadine (PATADAY) 0.1 % ophthalmic solution Place 1 drop into both eyes 2 times daily      azelastine (ASTELIN) 0.1 % nasal spray 1 spray by Nasal route 2 times daily Use in each nostril as directed      PROAIR  (90 Base) MCG/ACT inhaler Inhale 2 puffs into the lungs every 4-6 hours as needed      cloNIDine (CATAPRES) 0.1 MG tablet Take 1

## 2024-04-22 ENCOUNTER — OFFICE VISIT (OUTPATIENT)
Dept: BARIATRICS/WEIGHT MGMT | Age: 44
End: 2024-04-22
Payer: COMMERCIAL

## 2024-04-22 VITALS
HEART RATE: 78 BPM | OXYGEN SATURATION: 96 % | WEIGHT: 258.9 LBS | SYSTOLIC BLOOD PRESSURE: 98 MMHG | HEIGHT: 65 IN | BODY MASS INDEX: 43.13 KG/M2 | DIASTOLIC BLOOD PRESSURE: 68 MMHG

## 2024-04-22 DIAGNOSIS — E66.01 MORBID OBESITY WITH BMI OF 40.0-44.9, ADULT (HCC): Primary | ICD-10-CM

## 2024-04-22 DIAGNOSIS — Z79.899 MEDICATION MANAGEMENT: ICD-10-CM

## 2024-04-22 PROCEDURE — 99214 OFFICE O/P EST MOD 30 MIN: CPT | Performed by: NURSE PRACTITIONER

## 2024-04-22 RX ORDER — IBUPROFEN 600 MG/1
600 TABLET ORAL 3 TIMES DAILY PRN
COMMUNITY
Start: 2024-04-05

## 2024-04-22 RX ORDER — CYCLOBENZAPRINE HCL 10 MG
10 TABLET ORAL 3 TIMES DAILY
COMMUNITY
Start: 2024-04-18

## 2024-04-22 NOTE — PROGRESS NOTES
month      PLAN:    -Continue Adipex. RX REFILLED.    -BMI is over 30, or over 27 with weight-related risk factors.     -Patient demonstrated weight loss since last visit.    -Patient aware that weight must be lost at each visit or medication will be discontinued.     - Patient must lose 5% of body weight every 3 months to remain on medication.    -Patient is aware that in order to be successful, must combine Adipex with appropriate diet and exercise plan.     -Pt aware must continue to meet monthly in person while on this medication. F/u in one month.    -Check [employer] Rx Reporting System. Any concerns: NONE Reported     -If elderly or renal impairment, will use lower dose (15 mg daily) and avoid all together if GFR is less than 15. N/A      Orders Placed This Encounter   Procedures    Amb Referral to Nutrition Services        Orders Placed This Encounter   Medications    Phentermine-Topiramate 7.5-46 MG CP24     Sig: Take 1 tablet by mouth daily for 30 days. Take in am. Max Daily Amount: 1 tablet     Dispense:  30 capsule     Refill:  0        Follow Up:  Return in about 1 month (around 5/22/2024).      TRELL Bal - CNP

## 2024-05-01 ENCOUNTER — HOSPITAL ENCOUNTER (OUTPATIENT)
Age: 44
Discharge: HOME | End: 2024-05-01
Payer: COMMERCIAL

## 2024-05-01 ENCOUNTER — HOSPITAL ENCOUNTER (OUTPATIENT)
Dept: GENERAL RADIOLOGY | Age: 44
Discharge: HOME | End: 2024-05-01
Payer: COMMERCIAL

## 2024-05-01 DIAGNOSIS — M54.50 LOW BACK PAIN, UNSPECIFIED BACK PAIN LATERALITY, UNSPECIFIED CHRONICITY, UNSPECIFIED WHETHER SCIATICA PRESENT: ICD-10-CM

## 2024-05-01 PROCEDURE — 72100 X-RAY EXAM L-S SPINE 2/3 VWS: CPT

## 2024-05-22 ENCOUNTER — OFFICE VISIT (OUTPATIENT)
Dept: BARIATRICS/WEIGHT MGMT | Age: 44
End: 2024-05-22
Payer: COMMERCIAL

## 2024-05-22 ENCOUNTER — OFFICE VISIT (OUTPATIENT)
Dept: BARIATRICS/WEIGHT MGMT | Age: 44
End: 2024-05-22

## 2024-05-22 VITALS
SYSTOLIC BLOOD PRESSURE: 110 MMHG | OXYGEN SATURATION: 96 % | HEART RATE: 74 BPM | WEIGHT: 255.7 LBS | BODY MASS INDEX: 42.6 KG/M2 | HEIGHT: 65 IN | DIASTOLIC BLOOD PRESSURE: 70 MMHG

## 2024-05-22 VITALS — HEIGHT: 65 IN | WEIGHT: 255.8 LBS | BODY MASS INDEX: 42.62 KG/M2

## 2024-05-22 DIAGNOSIS — E66.01 MORBID OBESITY WITH BMI OF 40.0-44.9, ADULT (HCC): Primary | ICD-10-CM

## 2024-05-22 DIAGNOSIS — Z79.899 MEDICATION MANAGEMENT: ICD-10-CM

## 2024-05-22 PROCEDURE — 99214 OFFICE O/P EST MOD 30 MIN: CPT | Performed by: NURSE PRACTITIONER

## 2024-05-22 PROCEDURE — 90000 NO LOS: CPT | Performed by: NURSE PRACTITIONER

## 2024-05-22 RX ORDER — CETIRIZINE HYDROCHLORIDE 10 MG/1
10 TABLET ORAL DAILY
COMMUNITY

## 2024-05-22 NOTE — PROGRESS NOTES
Outpatient Nutrition Counseling - Medication Weight Loss Program    REASON FOR VISIT: Medication Program    Chief Complaint:    Chief Complaint   Patient presents with    Weight Management       SUBJECTIVE:  Pt here for nutrition consult in medication program. Her challenges include a lack of consistency in planning/tracking calorie deficit, sedentary lifestyle, eating out on the run and care giving for others.  The patient is a 44 y.o. female being seen for obesity, enrolled in Medication Weight Loss Program; [employer]mi[facility], Height: 163.8 cm (5' 4.5\"), Weight - Scale: 116 kg (255 lb 12.8 oz), Current Body mass index is 43.23 kg/m².  patient's PCP is Abdias Brunson MD     Comorbid Conditions:  Significant diseases affecting this patient are   Past Medical History:   Diagnosis Date    H/O echocardiogram 11/10/2017    EF 50-60%     High risk of cardiac event 10/16/2017    sinus rhythm    History of nuclear stress test 11/10/2017    abnormal stress test    Hx of echocardiogram 10/26/15    EF 60-65%, Normal, Mild LVH    Hypertension    .    Review of Systems - Review of Systems  Otherwise per HPI.    Allergies:  No Known Allergies    Past Surgical History:  Past Surgical History:   Procedure Laterality Date    BREAST REDUCTION SURGERY Bilateral     age 16    BREAST SURGERY         Family History:  Family History   Problem Relation Age of Onset    Uterine Cancer Mother     Heart Disease Mother         hole in heart    Breast Cancer Maternal Aunt     Breast Cancer Maternal Aunt     Ovarian Cancer Neg Hx        Social History:  Social History     Socioeconomic History    Marital status:      Spouse name: Not on file    Number of children: Not on file    Years of education: Not on file    Highest education level: Not on file   Occupational History    Not on file   Tobacco Use    Smoking status: Never    Smokeless tobacco: Never    Tobacco comments:     Reviewed 10/16/2015   Vaping Use    Vaping Use: Never used

## 2024-05-22 NOTE — PROGRESS NOTES
Continue to limit carb intake and convenience foods  - 64 oz water daily  - Increase activity as tolerated    2. Medication management  - Weight loss improved since transitioning to Qsymia  - Down 3.2 pounds this visit   - Denies any side effects  - Rx refill sent    PLAN:    -Continue Qsymia at  7.5/46 mg  dose. Refill sent.     -BMI is over 30, or over 27 with weight-related risk factors. Pt demonstrated weight loss since last visit.    -Dosing schedule as follows per  recommendation: Initial dose will be 3.75 mg/23 mg PO qDay for 14 days and then increased to 7.5 mg/46 mg PO qDay for 12 weeks at which point the pt's weight will be evaluated. If the pt has not lost at least 3% of their baseline weight at that point  then medication will be discontinued or increased to 11.25 mg/69 mg PO qDay for 14 days, followed by dosing 15 mg/92 mg PO qDay for 12 weeks of treatment. If pt has not lost 5% of baseline weight after 15 mg/92 mg dose, then medication will be gradually tapered and discontinued.       -Pt is aware that in order to be successful, must combine Qsymia with appropriate diet and exercise plan.     -Pt aware must continue to meet monthly in person while on this medication. F/u in one month.    -CMP must be checked every three months while on this medication.    -Current birth control measures include: not sexually active    -If elderly, renal impairment, or hepatic impairment, will use lower dose (7.5 mg/46 mg PO qDay) and avoid all together if severe hepatic impairment (Child-Cruz score 10-15). N/A      No orders of the defined types were placed in this encounter.       Orders Placed This Encounter   Medications    Phentermine-Topiramate 7.5-46 MG CP24     Sig: Take 1 tablet by mouth daily for 30 days. Take in am. Max Daily Amount: 1 tablet     Dispense:  30 capsule     Refill:  0        Follow Up:  Return in about 1 month (around 6/22/2024).      TRELL Bal - CNP

## 2024-06-26 ENCOUNTER — OFFICE VISIT (OUTPATIENT)
Dept: BARIATRICS/WEIGHT MGMT | Age: 44
End: 2024-06-26
Payer: COMMERCIAL

## 2024-06-26 VITALS
DIASTOLIC BLOOD PRESSURE: 64 MMHG | BODY MASS INDEX: 42.28 KG/M2 | SYSTOLIC BLOOD PRESSURE: 90 MMHG | HEIGHT: 65 IN | WEIGHT: 253.8 LBS | OXYGEN SATURATION: 93 % | HEART RATE: 81 BPM

## 2024-06-26 DIAGNOSIS — Z79.899 MEDICATION MANAGEMENT: ICD-10-CM

## 2024-06-26 DIAGNOSIS — E66.01 MORBID OBESITY WITH BMI OF 40.0-44.9, ADULT (HCC): Primary | ICD-10-CM

## 2024-06-26 PROCEDURE — 99214 OFFICE O/P EST MOD 30 MIN: CPT | Performed by: NURSE PRACTITIONER

## 2024-06-26 RX ORDER — SENNOSIDES 8.6 MG
650 CAPSULE ORAL EVERY 8 HOURS PRN
COMMUNITY

## 2024-06-26 ASSESSMENT — ENCOUNTER SYMPTOMS: NAUSEA: 1

## 2024-06-26 NOTE — PROGRESS NOTES
the day; avoid late eating  - 64 oz water daily  - Increase activity / walking as tolerated    2. Medication management  - Down 1.9 pounds this visit  - Denies any side effects  - Rx refill sent  - Weight evaluation in August  - [facility] one month    - Phentermine-Topiramate 7.5-46 MG CP24; Take 1 tablet by mouth daily for 30 days. Take in am. Max Daily Amount: 1 tablet  Dispense: 30 capsule; Refill: 0      PLAN:    -Continue Qsymia at   7.5/46 mg dose. Refill sent.     -BMI is over 30, or over 27 with weight-related risk factors. Pt demonstrated weight loss since last visit.    -Dosing schedule as follows per  recommendation: Initial dose will be 3.75 mg/23 mg PO qDay for 14 days and then increased to 7.5 mg/46 mg PO qDay for 12 weeks at which point the pt's weight will be evaluated. If the pt has not lost at least 3% of their baseline weight at that point  then medication will be discontinued or increased to 11.25 mg/69 mg PO qDay for 14 days, followed by dosing 15 mg/92 mg PO qDay for 12 weeks of treatment. If pt has not lost 5% of baseline weight after 15 mg/92 mg dose, then medication will be gradually tapered and discontinued.       -Pt is aware that in order to be successful, must combine Qsymia with appropriate diet and exercise plan.     -Pt aware must continue to meet monthly in person while on this medication. F/u in one month.    -CMP must be checked every three months while on this medication.    -If elderly, renal impairment, or hepatic impairment, will use lower dose (7.5 mg/46 mg PO qDay) and avoid all together if severe hepatic impairment (Child-Cruz score 10-15). N/A      No orders of the defined types were placed in this encounter.       Orders Placed This Encounter   Medications    Phentermine-Topiramate 7.5-46 MG CP24     Sig: Take 1 tablet by mouth daily for 30 days. Take in am. Max Daily Amount: 1 tablet     Dispense:  30 capsule     Refill:  0        Follow Up:  Return in about 1 month

## 2024-08-01 ENCOUNTER — OFFICE VISIT (OUTPATIENT)
Dept: BARIATRICS/WEIGHT MGMT | Age: 44
End: 2024-08-01
Payer: COMMERCIAL

## 2024-08-01 VITALS
HEART RATE: 80 BPM | OXYGEN SATURATION: 99 % | SYSTOLIC BLOOD PRESSURE: 122 MMHG | WEIGHT: 248.3 LBS | DIASTOLIC BLOOD PRESSURE: 80 MMHG | BODY MASS INDEX: 41.37 KG/M2 | HEIGHT: 65 IN

## 2024-08-01 DIAGNOSIS — E66.01 MORBID OBESITY WITH BMI OF 40.0-44.9, ADULT (HCC): Primary | ICD-10-CM

## 2024-08-01 DIAGNOSIS — Z79.899 MEDICATION MANAGEMENT: ICD-10-CM

## 2024-08-01 PROCEDURE — 99214 OFFICE O/P EST MOD 30 MIN: CPT | Performed by: NURSE PRACTITIONER

## 2024-08-01 NOTE — PROGRESS NOTES
Chief Complaint   Patient presents with    Weight Management     Med wm qsymia          SUBJECTIVE:    HPI: Patient is here with complaints of: monthly Qsymia visit.    Obesity with a BMI of Body mass index is 41.96 kg/m²..    Current weight: 248 pounds    Weight change since last visit: -5.5 pounds    Did pt fail to lose 3% or 5% of their baseline weight: NO (3% of baseline for 7.5 mg/46 mg dose and 5% of baseline for 15 mg/92 mg dose).     Current dose of Qsymia: 7.5 mg/ 46 mg.    Any new absolute contraindications (drug class allergy, pregnancy or breastfeeding, glaucoma, hyperthyroidism, current use of MAOIs, drug abuse, CAD, uncontrolled HTN, arrhythmias, stroke, CHF) to being on medication: DENIES    Pt complains of the following side effects: DENIES    Any new mood disorders or suicidal thoughts/behaviors: DENIES    Current dietary measures: more stress recently; tries to eat healthy; some \"food noise\"    Current exercise measures: more walking and running around all day    I have reviewed the patient's(pertinent information to this visit) medical history, family history(scanned in  the [employer] under \"patient questioner\"), social history and review of systems with the patient today in the office.          Past Surgical History:   Procedure Laterality Date    BREAST REDUCTION SURGERY Bilateral     age 16    BREAST SURGERY         Past Medical History:   Diagnosis Date    H/O echocardiogram 11/10/2017    EF 50-60%     High risk of cardiac event 10/16/2017    sinus rhythm    History of nuclear stress test 11/10/2017    abnormal stress test    Hx of echocardiogram 10/26/15    EF 60-65%, Normal, Mild LVH    Hypertension        Family History   Problem Relation Age of Onset    Uterine Cancer Mother     Heart Disease Mother         hole in heart    Breast Cancer Maternal Aunt     Breast Cancer Maternal Aunt     Ovarian Cancer Neg Hx        Social History     Socioeconomic History    Marital status:

## 2024-08-29 ENCOUNTER — LAB (OUTPATIENT)
Dept: SURGERY | Age: 44
End: 2024-08-29
Payer: COMMERCIAL

## 2024-08-29 DIAGNOSIS — Z79.899 MEDICATION MANAGEMENT: ICD-10-CM

## 2024-08-29 PROCEDURE — 36415 COLL VENOUS BLD VENIPUNCTURE: CPT | Performed by: NURSE PRACTITIONER

## 2024-08-30 LAB
ALBUMIN SERPL-MCNC: 4.5 G/DL (ref 3.4–5)
ALBUMIN/GLOB SERPL: 1.5 {RATIO} (ref 1.1–2.2)
ALP SERPL-CCNC: 64 U/L (ref 40–129)
ALT SERPL-CCNC: 13 U/L (ref 10–40)
ANION GAP SERPL CALCULATED.3IONS-SCNC: 13 MMOL/L (ref 3–16)
AST SERPL-CCNC: 20 U/L (ref 15–37)
BILIRUB SERPL-MCNC: <0.2 MG/DL (ref 0–1)
BUN SERPL-MCNC: 13 MG/DL (ref 7–20)
CALCIUM SERPL-MCNC: 10 MG/DL (ref 8.3–10.6)
CHLORIDE SERPL-SCNC: 106 MMOL/L (ref 99–110)
CO2 SERPL-SCNC: 18 MMOL/L (ref 21–32)
CREAT SERPL-MCNC: 0.9 MG/DL (ref 0.6–1.1)
GFR SERPLBLD CREATININE-BSD FMLA CKD-EPI: 80 ML/MIN/{1.73_M2}
GLUCOSE SERPL-MCNC: 95 MG/DL (ref 70–99)
POTASSIUM SERPL-SCNC: 4.1 MMOL/L (ref 3.5–5.1)
PROT SERPL-MCNC: 7.6 G/DL (ref 6.4–8.2)
SODIUM SERPL-SCNC: 137 MMOL/L (ref 136–145)

## 2024-09-04 ENCOUNTER — OFFICE VISIT (OUTPATIENT)
Dept: BARIATRICS/WEIGHT MGMT | Age: 44
End: 2024-09-04
Payer: COMMERCIAL

## 2024-09-04 VITALS
DIASTOLIC BLOOD PRESSURE: 82 MMHG | HEART RATE: 78 BPM | SYSTOLIC BLOOD PRESSURE: 120 MMHG | WEIGHT: 248.9 LBS | BODY MASS INDEX: 41.47 KG/M2 | HEIGHT: 65 IN | OXYGEN SATURATION: 97 %

## 2024-09-04 DIAGNOSIS — Z79.899 MEDICATION MANAGEMENT: ICD-10-CM

## 2024-09-04 DIAGNOSIS — E66.01 MORBID OBESITY WITH BMI OF 40.0-44.9, ADULT (HCC): Primary | ICD-10-CM

## 2024-09-04 PROCEDURE — 99214 OFFICE O/P EST MOD 30 MIN: CPT | Performed by: NURSE PRACTITIONER

## 2024-09-04 NOTE — PROGRESS NOTES
Chief Complaint   Patient presents with    Weight Management     F/U Medication WM Visit - HD Qsymia  Has Bar Cov         SUBJECTIVE:    HPI: Patient is here with complaints of: monthly Qsymia visit.    Obesity with a BMI of Body mass index is 42.06 kg/m²..    Current weight: 248 pounds    Weight change since last visit: +0.6 pounds    Current dose of Qsymia: 15 mg/ 92 mg.    Any new absolute contraindications (drug class allergy, pregnancy or breastfeeding, glaucoma, hyperthyroidism, current use of MAOIs, drug abuse, CAD, uncontrolled HTN, arrhythmias, stroke, CHF) to being on medication: DENIES    Pt complains of the following side effects: DENIES    Any new mood disorders or suicidal thoughts/behaviors: DENIES    Current dietary measures: drinking more calories; not tracking food calories; water intake limited; some calorie stacking in evenings    Current exercise measures: occasional walking    I have reviewed the patient's(pertinent information to this visit) medical history, family history(scanned in  the Mediat[employer] under \"patient questioner\"), social history and review of systems with the patient today in the office.          Past Surgical History:   Procedure Laterality Date    BREAST REDUCTION SURGERY Bilateral     age 16    BREAST SURGERY         Past Medical History:   Diagnosis Date    H/O echocardiogram 11/10/2017    EF 50-60%     High risk of cardiac event 10/16/2017    sinus rhythm    History of nuclear stress test 11/10/2017    abnormal stress test    Hx of echocardiogram 10/26/15    EF 60-65%, Normal, Mild LVH    Hypertension        Family History   Problem Relation Age of Onset    Uterine Cancer Mother     Heart Disease Mother         hole in heart    Breast Cancer Maternal Aunt     Breast Cancer Maternal Aunt     Ovarian Cancer Neg Hx        Social History     Socioeconomic History    Marital status:      Spouse name: Not on file    Number of children: Not on file    Years of education: Not 
No

## 2024-10-11 ENCOUNTER — OFFICE VISIT (OUTPATIENT)
Dept: BARIATRICS/WEIGHT MGMT | Age: 44
End: 2024-10-11
Payer: COMMERCIAL

## 2024-10-11 VITALS
DIASTOLIC BLOOD PRESSURE: 72 MMHG | BODY MASS INDEX: 40.9 KG/M2 | SYSTOLIC BLOOD PRESSURE: 126 MMHG | OXYGEN SATURATION: 99 % | HEIGHT: 65 IN | WEIGHT: 245.5 LBS | HEART RATE: 78 BPM

## 2024-10-11 DIAGNOSIS — E66.01 MORBID OBESITY WITH BMI OF 40.0-44.9, ADULT: Primary | ICD-10-CM

## 2024-10-11 DIAGNOSIS — Z79.899 MEDICATION MANAGEMENT: ICD-10-CM

## 2024-10-11 PROCEDURE — 99214 OFFICE O/P EST MOD 30 MIN: CPT | Performed by: NURSE PRACTITIONER

## 2024-10-11 ASSESSMENT — PATIENT HEALTH QUESTIONNAIRE - PHQ9
SUM OF ALL RESPONSES TO PHQ QUESTIONS 1-9: 2
2. FEELING DOWN, DEPRESSED OR HOPELESS: SEVERAL DAYS
SUM OF ALL RESPONSES TO PHQ QUESTIONS 1-9: 2
SUM OF ALL RESPONSES TO PHQ QUESTIONS 1-9: 2
SUM OF ALL RESPONSES TO PHQ9 QUESTIONS 1 & 2: 2
1. LITTLE INTEREST OR PLEASURE IN DOING THINGS: SEVERAL DAYS
SUM OF ALL RESPONSES TO PHQ QUESTIONS 1-9: 2

## 2024-10-11 NOTE — PROGRESS NOTES
Chief Complaint   Patient presents with    Weight Management     FU Med WM HD Qysmia Has Bar Cov.         SUBJECTIVE:    HPI: Patient is here with complaints of: monthly Qsymia visit.    Obesity with a BMI of Body mass index is 41.49 kg/m²..    Current weight: 245 pounds    Weight change since last visit: -3.4 pounds    Current dose of Qsymia: 15 mg/ 92 mg.    Any new absolute contraindications (drug class allergy, pregnancy or breastfeeding, glaucoma, hyperthyroidism, current use of MAOIs, drug abuse, CAD, uncontrolled HTN, arrhythmias, stroke, CHF) to being on medication: DENIES    Pt complains of the following side effects: DENIES    Any new mood disorders or suicidal thoughts/behaviors: DENIES    Current dietary measures: increased water, veggies and fruits this past month, calorie intake elevated    Current exercise measures: will start walking on treadmill soon     I have reviewed the patient's(pertinent information to this visit) medical history, family history(scanned in  the Med[employer] under \"patient questioner\"), social history and review of systems with the patient today in the office.          Past Surgical History:   Procedure Laterality Date    BREAST REDUCTION SURGERY Bilateral     age 16    BREAST SURGERY         Past Medical History:   Diagnosis Date    H/O echocardiogram 11/10/2017    EF 50-60%     High risk of cardiac event 10/16/2017    sinus rhythm    History of nuclear stress test 11/10/2017    abnormal stress test    Hx of echocardiogram 10/26/15    EF 60-65%, Normal, Mild LVH    Hypertension        Family History   Problem Relation Age of Onset    Uterine Cancer Mother     Heart Disease Mother         hole in heart    Breast Cancer Maternal Aunt     Breast Cancer Maternal Aunt     Ovarian Cancer Neg Hx        Social History     Socioeconomic History    Marital status:      Spouse name: Not on file    Number of children: Not on file    Years of education: Not on file    Highest education

## 2024-11-11 ENCOUNTER — OFFICE VISIT (OUTPATIENT)
Dept: BARIATRICS/WEIGHT MGMT | Age: 44
End: 2024-11-11
Payer: COMMERCIAL

## 2024-11-11 ENCOUNTER — HOSPITAL ENCOUNTER (OUTPATIENT)
Dept: WOMENS IMAGING | Age: 44
Discharge: HOME | End: 2024-11-11
Payer: COMMERCIAL

## 2024-11-11 VITALS — HEIGHT: 64 IN | WEIGHT: 237 LBS | BODY MASS INDEX: 40.46 KG/M2

## 2024-11-11 VITALS
SYSTOLIC BLOOD PRESSURE: 100 MMHG | HEART RATE: 74 BPM | DIASTOLIC BLOOD PRESSURE: 70 MMHG | HEIGHT: 65 IN | WEIGHT: 245.7 LBS | BODY MASS INDEX: 40.94 KG/M2 | OXYGEN SATURATION: 95 %

## 2024-11-11 DIAGNOSIS — E66.01 MORBID OBESITY WITH BMI OF 40.0-44.9, ADULT: Primary | ICD-10-CM

## 2024-11-11 DIAGNOSIS — Z79.899 MEDICATION MANAGEMENT: ICD-10-CM

## 2024-11-11 DIAGNOSIS — Z12.31 ENCOUNTER FOR SCREENING MAMMOGRAM FOR BREAST CANCER: ICD-10-CM

## 2024-11-11 PROCEDURE — 99214 OFFICE O/P EST MOD 30 MIN: CPT | Performed by: NURSE PRACTITIONER

## 2024-11-11 PROCEDURE — 77063 BREAST TOMOSYNTHESIS BI: CPT

## 2024-11-11 ASSESSMENT — ENCOUNTER SYMPTOMS: BACK PAIN: 1

## 2024-12-05 ENCOUNTER — HOSPITAL ENCOUNTER (OUTPATIENT)
Age: 44
Discharge: HOME | End: 2024-12-05
Payer: COMMERCIAL

## 2024-12-05 DIAGNOSIS — Z79.899 MEDICATION MANAGEMENT: ICD-10-CM

## 2024-12-05 LAB
ALBUMIN SERPL-MCNC: 4.1 G/DL (ref 3.4–5)
ALBUMIN/GLOB SERPL: 1.7 {RATIO} (ref 1.1–2.2)
ALP SERPL-CCNC: 71 U/L (ref 40–129)
ALT SERPL-CCNC: 17 U/L (ref 10–40)
ANION GAP SERPL CALCULATED.3IONS-SCNC: 11 MMOL/L (ref 9–17)
AST SERPL-CCNC: 15 U/L (ref 15–37)
BILIRUB SERPL-MCNC: 0.3 MG/DL (ref 0–1)
BUN SERPL-MCNC: 11 MG/DL (ref 7–20)
CALCIUM SERPL-MCNC: 9.2 MG/DL (ref 8.3–10.6)
CHLORIDE SERPL-SCNC: 110 MMOL/L (ref 99–110)
CO2 SERPL-SCNC: 21 MMOL/L (ref 21–32)
CREAT SERPL-MCNC: 0.8 MG/DL (ref 0.6–1.1)
GFR, ESTIMATED: 78 ML/MIN/1.73M2
GLUCOSE SERPL-MCNC: 81 MG/DL (ref 74–99)
POTASSIUM SERPL-SCNC: 4.1 MMOL/L (ref 3.5–5.1)
PROT SERPL-MCNC: 6.6 G/DL (ref 6.4–8.2)
SODIUM SERPL-SCNC: 143 MMOL/L (ref 136–145)

## 2024-12-05 PROCEDURE — 36415 COLL VENOUS BLD VENIPUNCTURE: CPT

## 2024-12-05 PROCEDURE — 80053 COMPREHEN METABOLIC PANEL: CPT
